# Patient Record
Sex: FEMALE | Race: WHITE | Employment: PART TIME | URBAN - METROPOLITAN AREA
[De-identification: names, ages, dates, MRNs, and addresses within clinical notes are randomized per-mention and may not be internally consistent; named-entity substitution may affect disease eponyms.]

---

## 2020-08-05 ENCOUNTER — APPOINTMENT (OUTPATIENT)
Dept: GENERAL RADIOLOGY | Age: 20
End: 2020-08-05
Attending: EMERGENCY MEDICINE
Payer: COMMERCIAL

## 2020-08-05 ENCOUNTER — HOSPITAL ENCOUNTER (EMERGENCY)
Age: 20
Discharge: HOME | End: 2020-08-05
Attending: EMERGENCY MEDICINE
Payer: COMMERCIAL

## 2020-08-05 ENCOUNTER — APPOINTMENT (OUTPATIENT)
Dept: CT IMAGING | Age: 20
End: 2020-08-05
Attending: EMERGENCY MEDICINE
Payer: COMMERCIAL

## 2020-08-05 VITALS
HEART RATE: 80 BPM | TEMPERATURE: 97.3 F | RESPIRATION RATE: 18 BRPM | WEIGHT: 182.76 LBS | OXYGEN SATURATION: 98 % | BODY MASS INDEX: 29.37 KG/M2 | HEIGHT: 66 IN | SYSTOLIC BLOOD PRESSURE: 127 MMHG | DIASTOLIC BLOOD PRESSURE: 53 MMHG

## 2020-08-05 DIAGNOSIS — R07.89 ATYPICAL CHEST PAIN: Primary | ICD-10-CM

## 2020-08-05 LAB
ALBUMIN SERPL-MCNC: 4.3 G/DL (ref 3.5–5)
ALBUMIN/GLOB SERPL: 1.2 {RATIO} (ref 1.1–2.2)
ALP SERPL-CCNC: 77 U/L (ref 45–117)
ALT SERPL-CCNC: 15 U/L (ref 12–78)
AMPHET UR QL SCN: NEGATIVE
ANION GAP SERPL CALC-SCNC: 3 MMOL/L (ref 5–15)
AST SERPL-CCNC: 8 U/L (ref 15–37)
BARBITURATES UR QL SCN: NEGATIVE
BASOPHILS # BLD: 0 K/UL (ref 0–0.1)
BASOPHILS NFR BLD: 1 % (ref 0–1)
BENZODIAZ UR QL: NEGATIVE
BILIRUB SERPL-MCNC: 0.2 MG/DL (ref 0.2–1)
BUN SERPL-MCNC: 10 MG/DL (ref 6–20)
BUN/CREAT SERPL: 11 (ref 12–20)
CALCIUM SERPL-MCNC: 9.1 MG/DL (ref 8.5–10.1)
CANNABINOIDS UR QL SCN: NEGATIVE
CHLORIDE SERPL-SCNC: 107 MMOL/L (ref 97–108)
CK SERPL-CCNC: 105 U/L (ref 26–192)
CO2 SERPL-SCNC: 28 MMOL/L (ref 21–32)
COCAINE UR QL SCN: NEGATIVE
CREAT SERPL-MCNC: 0.89 MG/DL (ref 0.55–1.02)
D DIMER PPP FEU-MCNC: 0.22 MG/L FEU (ref 0–0.65)
DIFFERENTIAL METHOD BLD: ABNORMAL
DRUG SCRN COMMENT,DRGCM: NORMAL
EOSINOPHIL # BLD: 0.1 K/UL (ref 0–0.4)
EOSINOPHIL NFR BLD: 3 % (ref 0–7)
ERYTHROCYTE [DISTWIDTH] IN BLOOD BY AUTOMATED COUNT: 14.6 % (ref 11.5–14.5)
ETHANOL SERPL-MCNC: <10 MG/DL
GLOBULIN SER CALC-MCNC: 3.7 G/DL (ref 2–4)
GLUCOSE SERPL-MCNC: 97 MG/DL (ref 65–100)
HCT VFR BLD AUTO: 39.7 % (ref 35–47)
HGB BLD-MCNC: 12.3 G/DL (ref 11.5–16)
IMM GRANULOCYTES # BLD AUTO: 0 K/UL (ref 0–0.04)
IMM GRANULOCYTES NFR BLD AUTO: 0 % (ref 0–0.5)
LYMPHOCYTES # BLD: 1.5 K/UL (ref 0.8–3.5)
LYMPHOCYTES NFR BLD: 30 % (ref 12–49)
MCH RBC QN AUTO: 24.8 PG (ref 26–34)
MCHC RBC AUTO-ENTMCNC: 31 G/DL (ref 30–36.5)
MCV RBC AUTO: 80.2 FL (ref 80–99)
METHADONE UR QL: NEGATIVE
MONOCYTES # BLD: 0.4 K/UL (ref 0–1)
MONOCYTES NFR BLD: 8 % (ref 5–13)
NEUTS SEG # BLD: 2.9 K/UL (ref 1.8–8)
NEUTS SEG NFR BLD: 58 % (ref 32–75)
NRBC # BLD: 0 K/UL (ref 0–0.01)
NRBC BLD-RTO: 0 PER 100 WBC
OPIATES UR QL: NEGATIVE
PCP UR QL: NEGATIVE
PLATELET # BLD AUTO: 282 K/UL (ref 150–400)
PMV BLD AUTO: 10.5 FL (ref 8.9–12.9)
POTASSIUM SERPL-SCNC: 4.1 MMOL/L (ref 3.5–5.1)
PROT SERPL-MCNC: 8 G/DL (ref 6.4–8.2)
RBC # BLD AUTO: 4.95 M/UL (ref 3.8–5.2)
SODIUM SERPL-SCNC: 138 MMOL/L (ref 136–145)
TROPONIN I SERPL-MCNC: <0.05 NG/ML
WBC # BLD AUTO: 5 K/UL (ref 3.6–11)

## 2020-08-05 PROCEDURE — 82550 ASSAY OF CK (CPK): CPT

## 2020-08-05 PROCEDURE — 93005 ELECTROCARDIOGRAM TRACING: CPT

## 2020-08-05 PROCEDURE — 80307 DRUG TEST PRSMV CHEM ANLYZR: CPT

## 2020-08-05 PROCEDURE — 84484 ASSAY OF TROPONIN QUANT: CPT

## 2020-08-05 PROCEDURE — 71046 X-RAY EXAM CHEST 2 VIEWS: CPT

## 2020-08-05 PROCEDURE — 70450 CT HEAD/BRAIN W/O DYE: CPT

## 2020-08-05 PROCEDURE — 99284 EMERGENCY DEPT VISIT MOD MDM: CPT

## 2020-08-05 PROCEDURE — 85379 FIBRIN DEGRADATION QUANT: CPT

## 2020-08-05 PROCEDURE — 36415 COLL VENOUS BLD VENIPUNCTURE: CPT

## 2020-08-05 PROCEDURE — 71045 X-RAY EXAM CHEST 1 VIEW: CPT

## 2020-08-05 PROCEDURE — 85025 COMPLETE CBC W/AUTO DIFF WBC: CPT

## 2020-08-05 PROCEDURE — 80053 COMPREHEN METABOLIC PANEL: CPT

## 2020-08-05 NOTE — ED NOTES
Pt. Resting comfortably in bed at this time with call bell in reach. PT. And family updated on plan of care.

## 2020-08-05 NOTE — ED NOTES
Pt reported  has been feeling an \"electrical shock\" moving from her chest to her left leg and upper left side of neck. Pt reported this has been an ongoing problem since Jan and is seeing a cardiologist. Pt reported it has gotten worse this week as the Pt is intermittent gasping for air, SOB, weakness, dizziness, extremely fatigue. Pt roommate reports Pt has trouble standing up, walking up steps without feeling SOB. Pt has a heart monitor on place about a week ago. Pt reports this is her second heart monitor test.     1600 MD at bedside evaluating PT     1724 Pt resting in POC with friend at bedside     1753 Pt seems to return back to her normal baseline of LOC and reporting she feels fine now. Pt is no longer drowsy and fatigued.

## 2020-08-05 NOTE — DISCHARGE INSTRUCTIONS
You were evaluated in the emergency department for chest pain and gasping episodes. Your examination was reassuring as was your work-up including blood work, ekg, chest xray, and CT scan of your head. It will be important for you to follow-up with a primary care physician in 3-5 days. Please follow up with Dr. Omayra Solorio about your Holter monitor. If you develop worsening symptoms such as worsening gasping episodes, chest pain, or confusion, please return to the emergency department immediately.

## 2020-08-05 NOTE — ED PROVIDER NOTES
EMERGENCY DEPARTMENT HISTORY AND PHYSICAL EXAM      Date: 8/5/2020  Patient Name: Saba Guo    History of Presenting Illness     Chief Complaint   Patient presents with    Chest Pain     Patient presents to the ED complaining of worsening left sided chest pain. Patient is currenly wearing a Holter monitor and under the care of Abraham Peraza MD, [facility]. Patient acknowledges dizziness and nausea. History Provided By: Patient    HPI: Saba Guo, 21 y.o. female without significant medical history presents to the ED with cc of chest pain. Patient complains of an atypical chest pain described as electrical shock sensations that will occur intermittently. She states that symptoms started in January of this year and that they started out occurring only once every other week. Now she is having approximately 8 episodes a day. She states that she feels that she is gasping for air and she feels her heart racing afterwards. Her friend at bedside states that she has exercise fatigue, dyspnea on exertion, and general lack of energy. Normal appetite. She states that she has also felt confused recently. She denies any fevers, chills, head injury, recent illness. She denies abdominal pain, nausea, vomiting, or significant medical or surgical history. She has been following with Dr. Camryn Thomas, cardiology, and is currently being evaluated by Holter monitor which she is wearing. She denies any alcohol use or drug use. Patient states that she has been evaluated by multiple doctors for this condition and has not received any answer, they come to the ED today as her symptoms are worsening. There are no other complaints, changes, or physical findings at this time. PCP: Other, MD Genoveva    No current facility-administered medications on file prior to encounter. No current outpatient medications on file prior to encounter. Past History     Past Medical History:  History reviewed.  No pertinent past medical history. Past Surgical History:  History reviewed. No pertinent surgical history. Family History:  History reviewed. No pertinent family history. Social History:  Social History     Tobacco Use    Smoking status: Not on file   Substance Use Topics    Alcohol use: Not on file    Drug use: Not on file       Allergies:  No Known Allergies      Review of Systems   Review of Systems   Constitutional: Positive for fatigue. Negative for chills and fever. HENT: Negative. Eyes: Negative for visual disturbance. Respiratory: Positive for shortness of breath. Negative for cough. Cardiovascular: Positive for chest pain. Negative for leg swelling. Gastrointestinal: Negative for abdominal pain, nausea and vomiting. Genitourinary: Negative. Musculoskeletal: Negative for back pain and gait problem. Skin: Negative for color change and rash. Neurological: Negative for dizziness, weakness, light-headedness and headaches. Hematological: Does not bruise/bleed easily. Psychiatric/Behavioral: Positive for confusion. All other systems reviewed and are negative. Physical Exam   Physical Exam  Vitals signs and nursing note reviewed. Constitutional:       General: She is not in acute distress. Appearance: Normal appearance. She is not ill-appearing or toxic-appearing. Comments: Patient appears intoxicated with slurred speech   HENT:      Head: Normocephalic and atraumatic. Nose: Nose normal.      Mouth/Throat:      Mouth: Mucous membranes are moist.   Eyes:      Extraocular Movements: Extraocular movements intact. Pupils: Pupils are equal, round, and reactive to light. Neck:      Musculoskeletal: Normal range of motion and neck supple. Cardiovascular:      Rate and Rhythm: Normal rate and regular rhythm. Heart sounds: No murmur. Pulmonary:      Effort: Pulmonary effort is normal. No respiratory distress. Breath sounds: Normal breath sounds. No wheezing. Abdominal:      General: There is no distension. Palpations: Abdomen is soft. Tenderness: There is no abdominal tenderness. There is no guarding or rebound. Musculoskeletal: Normal range of motion. General: No swelling or tenderness. Right lower leg: No edema. Left lower leg: No edema. Skin:     General: Skin is warm and dry. Coloration: Skin is not pale. Findings: No erythema. Neurological:      General: No focal deficit present. Mental Status: She is alert and oriented to person, place, and time. Diagnostic Study Results     Labs -     Recent Results (from the past 12 hour(s))   EKG, 12 LEAD, INITIAL    Collection Time: 08/05/20  2:33 PM   Result Value Ref Range    Ventricular Rate 82 BPM    Atrial Rate 82 BPM    P-R Interval 122 ms    QRS Duration 78 ms    Q-T Interval 364 ms    QTC Calculation (Bezet) 425 ms    Calculated P Axis 53 degrees    Calculated R Axis 89 degrees    Calculated T Axis 47 degrees    Diagnosis       Normal sinus rhythm  Normal ECG  No previous ECGs available     CBC WITH AUTOMATED DIFF    Collection Time: 08/05/20  2:55 PM   Result Value Ref Range    WBC 5.0 3.6 - 11.0 K/uL    RBC 4.95 3.80 - 5.20 M/uL    HGB 12.3 11.5 - 16.0 g/dL    HCT 39.7 35.0 - 47.0 %    MCV 80.2 80.0 - 99.0 FL    MCH 24.8 (L) 26.0 - 34.0 PG    MCHC 31.0 30.0 - 36.5 g/dL    RDW 14.6 (H) 11.5 - 14.5 %    PLATELET 106 747 - 965 K/uL    MPV 10.5 8.9 - 12.9 FL    NRBC 0.0 0  WBC    ABSOLUTE NRBC 0.00 0.00 - 0.01 K/uL    NEUTROPHILS 58 32 - 75 %    LYMPHOCYTES 30 12 - 49 %    MONOCYTES 8 5 - 13 %    EOSINOPHILS 3 0 - 7 %    BASOPHILS 1 0 - 1 %    IMMATURE GRANULOCYTES 0 0.0 - 0.5 %    ABS. NEUTROPHILS 2.9 1.8 - 8.0 K/UL    ABS. LYMPHOCYTES 1.5 0.8 - 3.5 K/UL    ABS. MONOCYTES 0.4 0.0 - 1.0 K/UL    ABS. EOSINOPHILS 0.1 0.0 - 0.4 K/UL    ABS. BASOPHILS 0.0 0.0 - 0.1 K/UL    ABS. IMM.  GRANS. 0.0 0.00 - 0.04 K/UL    DF AUTOMATED     METABOLIC PANEL, COMPREHENSIVE Collection Time: 08/05/20  2:55 PM   Result Value Ref Range    Sodium 138 136 - 145 mmol/L    Potassium 4.1 3.5 - 5.1 mmol/L    Chloride 107 97 - 108 mmol/L    CO2 28 21 - 32 mmol/L    Anion gap 3 (L) 5 - 15 mmol/L    Glucose 97 65 - 100 mg/dL    BUN 10 6 - 20 MG/DL    Creatinine 0.89 0.55 - 1.02 MG/DL    BUN/Creatinine ratio 11 (L) 12 - 20      GFR est AA >60 >60 ml/min/1.73m2    GFR est non-AA >60 >60 ml/min/1.73m2    Calcium 9.1 8.5 - 10.1 MG/DL    Bilirubin, total 0.2 0.2 - 1.0 MG/DL    ALT (SGPT) 15 12 - 78 U/L    AST (SGOT) 8 (L) 15 - 37 U/L    Alk. phosphatase 77 45 - 117 U/L    Protein, total 8.0 6.4 - 8.2 g/dL    Albumin 4.3 3.5 - 5.0 g/dL    Globulin 3.7 2.0 - 4.0 g/dL    A-G Ratio 1.2 1.1 - 2.2     CK W/ REFLX CKMB    Collection Time: 08/05/20  2:55 PM   Result Value Ref Range     26 - 192 U/L   TROPONIN I    Collection Time: 08/05/20  2:55 PM   Result Value Ref Range    Troponin-I, Qt. <0.05 <0.05 ng/mL   D DIMER    Collection Time: 08/05/20  4:19 PM   Result Value Ref Range    D-dimer 0.22 0.00 - 0.65 mg/L FEU   ETHYL ALCOHOL    Collection Time: 08/05/20  4:19 PM   Result Value Ref Range    ALCOHOL(ETHYL),SERUM <10 <10 MG/DL   DRUG SCREEN, URINE    Collection Time: 08/05/20  6:33 PM   Result Value Ref Range    AMPHETAMINES Negative NEG      BARBITURATES Negative NEG      BENZODIAZEPINES Negative NEG      COCAINE Negative NEG      METHADONE Negative NEG      OPIATES Negative NEG      PCP(PHENCYCLIDINE) Negative NEG      THC (TH-CANNABINOL) Negative NEG      Drug screen comment (NOTE)        Radiologic Studies -   CT HEAD WO CONT   Final Result      XR CHEST PORT   Final Result   IMPRESSION: No acute findings. CT Results  (Last 48 hours)               08/05/20 1741  CT HEAD WO CONT Final result    Impression:  IMPRESSION: No acute intracranial abnormality. Narrative:  INDICATION:  Dizziness, headache       EXAM: CT HEAD WITHOUT CONTRAST. COMPARISON: None.        PROCEDURE: Sequential axial images of the head were performed without   intravenous contrast. Soft tissue and bone windows were examined. Images were   reformatted in the sagittal and coronal planes. .CT dose reduction was achieved   through use of a standardized protocol tailored for this examination and   automatic exposure control for dose modulation. FINDINGS: The brain parenchyma and ventricular system are unremarkable in   appearance for age. There is no parenchymal mass or hemorrhage and no shift of   midline structures or extra-axial collection. No obvious acute ischemia. No bony   abnormality. CXR Results  (Last 48 hours)               08/05/20 1706  XR CHEST PORT Final result    Impression:  IMPRESSION: No acute findings. Narrative:  EXAM: XR CHEST PORT       INDICATION: Chest Pain       COMPARISON: None. FINDINGS: A portable AP radiograph of the chest was obtained at 16:44 hours. The   patient is on a cardiac monitor. Battery pack/electronic device overlies the   upper left chest. The lungs are clear. The cardiac and mediastinal contours and   pulmonary vascularity are normal.  The bones and soft tissues are grossly within   normal limits. Medical Decision Making   I am the first provider for this patient. I reviewed the vital signs, available nursing notes, past medical history, past surgical history, family history and social history. Vital Signs-Reviewed the patient's vital signs. Patient Vitals for the past 12 hrs:   Temp Pulse Resp BP SpO2   08/05/20 1919 97.3 °F (36.3 °C) 80 18 127/53 98 %   08/05/20 1815  88 19 99/61 99 %   08/05/20 1800  83 22 107/60 97 %   08/05/20 1745  80 22 117/67 99 %   08/05/20 1620  80 20 (!) 104/35 99 %   08/05/20 1432 98.2 °F (36.8 °C) 97 18 130/78 100 %       Records Reviewed: Nursing Notes    Provider Notes (Medical Decision Making):    This is a 80-year-old female here with atypical electric-like chest pain episodes with associated shortness of breath and palpitations. She is currently being evaluated by cardiology with a Holter monitor. This has been a chronic condition for her that has been progressively worsening over the past few weeks. Patient states that she does feel confused and clinically she does appear intoxicated. She has no focal neurologic deficits and she is awake alert oriented x3. Her vital signs demonstrate that she is afebrile and all vitals are stable throughout entire ED stay. She will be monitored on a cardiac monitor to look for any events. I will obtain broad work-up consisting of CBC, CMP, TSH, ammonia, blood alcohol level, UDS, urinalysis, CT head, chest x-ray, d-dimer. I went back to reevaluate patient and she looks like a completely different person. She is awake and alert and no longer has any of the somnolence or grogginess that was present on my initial evaluation. Patient and her friend at bedside states that she gets like that when she is under stress and anxiety. Patient has not had any further electrical shock episodes since that she has been here in the ED. Her work-up today is unremarkable and demonstrates no significant leukocytosis, anemia, electrolyte abnormality, dehydration. Chest x-ray is unremarkable. D-dimer is negative and I do not feel this is related to a thromboembolus. Troponin negative with nonischemic EKG. Her symptoms do not appear consistent with seizure activity as she is responsive during these electrical shock episodes. I question if this could be psychogenic related or may be even related to GERD. Patient is going to follow-up with her PCP and she was given strict return ED precautions. ED Course:   Initial assessment performed. The patients presenting problems have been discussed, and they are in agreement with the care plan formulated and outlined with them. I have encouraged them to ask questions as they arise throughout their visit.     ED Course as of Aug 06 0029   Wed Aug 05, 2020   1549 EKG per my interpretation normal sinus rhythm, rate 82 bpm, normal axis, no acute ischemic changes, occasional PAC, no interval changes. [AK]      ED Course User Index  [AK] Joaquim Hernandez MD       Discharge Note:  The patient has been re-evaluated and is ready for discharge. Reviewed available results with patient. Counseled patient on diagnosis and care plan. Patient has expressed understanding, and all questions have been answered. Patient agrees with plan and agrees to follow up as recommended, or to return to the ED if their symptoms worsen. Discharge instructions have been provided and explained to the patient, along with reasons to return to the ED. Disposition:  Discharge home    DISCHARGE PLAN:  1. There are no discharge medications for this patient. 2.   Follow-up Information     Follow up With Specialties Details Why Cami Thrasher MD Cardiology Call   [address] [place] [address]  [address] [phone] [facility] Schedule an appointment as soon as possible for a visit  to establish with a PCP [place] [phone]        3. Return to ED if worse     Diagnosis     Clinical Impression:   1. Atypical chest pain        Attestations:    Geoff Hendrix MD    Please note that this dictation was completed with [employer], the computer voice recognition software. Quite often unanticipated grammatical, syntax, homophones, and other interpretive errors are inadvertently transcribed by the computer software. Please disregard these errors. Please excuse any errors that have escaped final proofreading. Thank you.

## 2020-08-05 NOTE — ED NOTES
Report received from [address] [place] [address],[address], [address] [facility]. They advised of the patient's chief complaint, current status, orders completed (to include IV access/medications/radiology testing), outstanding orders that still need to be completed, and the treatment plan. Questions asked and answered prior to assumption of care.

## 2020-08-06 LAB
ATRIAL RATE: 82 BPM
CALCULATED P AXIS, ECG09: 53 DEGREES
CALCULATED R AXIS, ECG10: 89 DEGREES
CALCULATED T AXIS, ECG11: 47 DEGREES
DIAGNOSIS, 93000: NORMAL
P-R INTERVAL, ECG05: 122 MS
Q-T INTERVAL, ECG07: 364 MS
QRS DURATION, ECG06: 78 MS
QTC CALCULATION (BEZET), ECG08: 425 MS
VENTRICULAR RATE, ECG03: 82 BPM

## 2020-08-06 NOTE — ED NOTES
Patient appears anxious and states she is ready to leave, refused discharge vital signs. Asked to have IV taken out so she can leave. Went over discharge paper work and patient walked out with roommate.

## 2020-08-06 NOTE — ED NOTES
Patient was provided with discharge instructions. Instructions and any medications were reviewed with the patient &/or family by Dr. Kaden Lemos. Questions and concerns addressed by the provider. Patient discharged in stable condition via ambulation and was escorted by Friend.